# Patient Record
Sex: MALE | Race: ASIAN | Employment: FULL TIME | URBAN - METROPOLITAN AREA
[De-identification: names, ages, dates, MRNs, and addresses within clinical notes are randomized per-mention and may not be internally consistent; named-entity substitution may affect disease eponyms.]

---

## 2017-02-16 PROBLEM — S86.012S: Status: ACTIVE | Noted: 2017-02-16

## 2017-02-16 PROBLEM — S52.021S: Status: ACTIVE | Noted: 2017-02-16

## 2020-10-14 PROBLEM — R10.2 PERINEAL PAIN: Status: ACTIVE | Noted: 2020-10-14

## 2021-06-22 ENCOUNTER — HOSPITAL ENCOUNTER (OUTPATIENT)
Facility: HOSPITAL | Age: 36
Setting detail: OBSERVATION
Discharge: HOME | End: 2021-06-23
Admitting: INTERNAL MEDICINE
Payer: COMMERCIAL

## 2021-06-22 ENCOUNTER — APPOINTMENT (OUTPATIENT)
Dept: MRI IMAGING | Facility: HOSPITAL | Age: 36
End: 2021-06-22
Attending: EMERGENCY MEDICINE
Payer: COMMERCIAL

## 2021-06-22 ENCOUNTER — APPOINTMENT (OUTPATIENT)
Dept: CT IMAGING | Facility: HOSPITAL | Age: 36
End: 2021-06-22
Payer: COMMERCIAL

## 2021-06-22 DIAGNOSIS — N28.9 RENAL INSUFFICIENCY: ICD-10-CM

## 2021-06-22 DIAGNOSIS — G45.4 TGA (TRANSIENT GLOBAL AMNESIA): Primary | ICD-10-CM

## 2021-06-22 PROCEDURE — 70551 MRI BRAIN STEM W/O DYE: CPT | Performed by: EMERGENCY MEDICINE

## 2021-06-22 PROCEDURE — 70450 CT HEAD/BRAIN W/O DYE: CPT

## 2021-06-22 PROCEDURE — 99244 OFF/OP CNSLTJ NEW/EST MOD 40: CPT | Performed by: OTHER

## 2021-06-22 RX ORDER — ACETAMINOPHEN 325 MG/1
650 TABLET ORAL EVERY 6 HOURS PRN
Status: DISCONTINUED | OUTPATIENT
Start: 2021-06-22 | End: 2021-06-23

## 2021-06-22 RX ORDER — SODIUM CHLORIDE 9 MG/ML
INJECTION, SOLUTION INTRAVENOUS CONTINUOUS
Status: DISCONTINUED | OUTPATIENT
Start: 2021-06-22 | End: 2021-06-23

## 2021-06-22 RX ORDER — ONDANSETRON 2 MG/ML
4 INJECTION INTRAMUSCULAR; INTRAVENOUS EVERY 6 HOURS PRN
Status: DISCONTINUED | OUTPATIENT
Start: 2021-06-22 | End: 2021-06-23

## 2021-06-22 RX ORDER — MELATONIN
3 NIGHTLY PRN
Status: DISCONTINUED | OUTPATIENT
Start: 2021-06-22 | End: 2021-06-23

## 2021-06-22 NOTE — ED PROVIDER NOTES
Patient Seen in: [facility] AND [facility] Emergency Department    History   Patient presents with:  Dizziness  Altered Mental Status      HPI    57-year-old male presents the ER for altered mental status. Patient denies any medical history.   Patient states that use: No      ROS  All pertinent positives for the review of systems are mentioned in the HPI  All other organ systems are reviewed and are negative. Constitutional and vital signs reviewed.       Social History and Family History elements reviewed from t Cranial Nerves: No cranial nerve deficit, dysarthria or facial asymmetry. Sensory: No sensory deficit. Motor: No weakness. Deep Tendon Reflexes: Reflexes are normal and symmetric.    Psychiatric:         Mood and Affect: Mood normal. Abdiel Alvarez MD on 6/22/2021 at 4:26 PM     Finalized by (CST): Winston Paz MD on 6/22/2021 at 4:28 PM          MRI BRAIN WO ACUTE (3) SEQUENCE (CPT=70551)    Result Date: 6/22/2021  CONCLUSION:   Negative for acute infarct.   Negative for acute intracr evaluation. Patient without any neuro deficits the ER.     Condition upon leaving the department: Stable    Total Critical Care Time: 44 minutes including time spent examining and re-evaluating the patient, ordering and reviewing laboratory tests, document

## 2021-06-22 NOTE — CONSULTS
[facility]  [address] [facility], [address] [place] [address]  [phone]        [address]    Report of Consultation  Fabi June Patient Status:  Emergency     1985 MRN  and 1year-old children are currently sick. Patient works in \"[employer] \"  His exam is nonfocal.  He had very subtle internal rotation of his right arm on pronator drift testing, however he had no other signs of right-sided weakness or symptoms Mother    • Dementia Maternal Grandmother    • Cancer Maternal Grandfather         brain ca       Social History    Socioeconomic History      Marital status:       Spouse name: Not on file      Number of children: Not on file      Years of educatio Status: Alert and attentive. Oriented to person. He guessed said the season was spring. He did not remember the month. He did not know the day of the week, and guessed it was Wednesday. He did not know the date. He knew the year.   He had 5 out of 5 i Tremor:      Reflexes:    C5 C6 C7  L4 S1   R 1+ 1+  2+ 2+   L 1+ 1+  2+ 2+   Adductor Spread: No adductor spread noted. Frontal release signs:Not assessed.     Jaw Jerk:    Ken's sign:absent   Nonsustained clonus: Absent   Sustained clonus: Absent American 63 >=60    GFR, -American 73 >=60   CBC W/ DIFFERENTIAL    Collection Time: 06/22/21  4:07 PM   Result Value Ref Range    WBC 11.6 (H) 4.0 - 11.0 x10(3) uL    RBC 5.60 4.30 - 5.70 x10(6)uL    HGB 16.4 13.0 - 17.5 g/dL    HCT 47.3 39.0 - 53. he is on a nonrebreather mask, has deficits in his short-term memory, and continues to repeat the same questions. He had a questionable right pronator drift, but no loss of dexterity in his right hand, and 5 out of 5 strength in all muscle groups.   Most l

## 2021-06-22 NOTE — ED INITIAL ASSESSMENT (HPI)
Pt became dizzy and confused while at the gym today with c/o right sided headache. Pt does not recall phoning his wife to come pick him up. Wife reports he is asking questions repeatedly. Pt able to state name and  and follows commands.

## 2021-06-22 NOTE — ED QUICK NOTES
Assumed care of patient from CT. Patients wife at bedside as historian, stating patient was at the gym, called her saying he was light headed and at the gym but very confused.  Patient able to answer orientation questions appropriately, but keep stating \"

## 2021-06-22 NOTE — H&P
[facility] Hospitalist H&P       CC: Altered Mental Status     PCP: Gilmar Crowe MD    History of Present Illness:   38year old male with no prior medical history who presents to the hospital for evaluation of altered mental status.  Patient is accompan distress  HEENT: atraumatic, sclera anicteric, oral mucosa normal   Neck: non tender, no adenopathy   Lungs: clear to ausculation bilaterally, no wheezing  Heart: Regular rate and rhythm  Abdomen: soft, non tender, non distended   Extremities: No edema  Sk

## 2021-06-22 NOTE — ED QUICK NOTES
Orders for admission, patient is aware of plan and ready to go upstairs. Any questions, please call ED RN Jamia Haile  at extension [phone].    Type of COVID test sent: none- fully vaccinated   COVID Suspicion level: Low    Titratable drug(s) infusing: none  Rate:

## 2021-06-22 NOTE — ED QUICK NOTES
Patient dipped down to 75% on monitor, patient awake and stating he feels light headed.  10liters NRB placed on patient

## 2021-06-23 VITALS
WEIGHT: 210.88 LBS | SYSTOLIC BLOOD PRESSURE: 114 MMHG | BODY MASS INDEX: 26.22 KG/M2 | RESPIRATION RATE: 16 BRPM | HEART RATE: 80 BPM | DIASTOLIC BLOOD PRESSURE: 73 MMHG | HEIGHT: 75 IN | OXYGEN SATURATION: 97 % | TEMPERATURE: 98 F

## 2021-06-23 PROCEDURE — 95816 EEG AWAKE AND DROWSY: CPT | Performed by: OTHER

## 2021-06-23 PROCEDURE — 99213 OFFICE O/P EST LOW 20 MIN: CPT | Performed by: OTHER

## 2021-06-23 NOTE — DISCHARGE SUMMARY
General Medicine Discharge Summary     Patient ID:  Sanjana Karimi  38year old  6/30/1985    Admit date: 6/22/2021    Discharge date and time: 6/23/21      Attending Physician: DO Michela Goyal

## 2021-06-23 NOTE — PROCEDURES
EEG report    REFERRING PHYSICIAN: China Burton DO    PCP and phone number:  Jordan Oliveira MD  [phone]    TECHNIQUE: 21 channels of EEG, 2 channels of EOG, and 1 channel of EKG were recorded utilizing the International 10/20 System.  The rec

## 2021-06-23 NOTE — PROGRESS NOTES
[facility] Hospitalist Progress Note     CC: Hospital Follow up    PCP: Jean-Claude Harris MD       Assessment/Plan:   38year old male with no prior medical history who presents to the hospital for evaluation of altered mental status.     Altered mental status (BP Location: Right arm)   Pulse 71   Temp 98.4 °F (36.9 °C) (Oral)   Resp 14   Ht 6' 3\" (1.905 m)   Wt 210 lb 14.4 oz (95.7 kg)   SpO2 97%   BMI 26.36 kg/m²   General: Alert, no acute distress  HEENT: back of head with erythmatous area, consistent with t

## 2021-06-23 NOTE — PLAN OF CARE
Problem: Patient Centered Care  Goal: Patient preferences are identified and integrated in the patient's plan of care  Description: Interventions:  - What would you like us to know as we care for you?  I have two young daughters  - Provide timely, complet and request assistance  - Assess pain using appropriate pain scale  - Administer analgesics based on type and severity of pain and evaluate response  - Implement non-pharmacological measures as appropriate and evaluate response  - Consider cultural and soc post-hospital services based on physician/LIP order or complex needs related to functional status, cognitive ability or social support system  Outcome: Progressing     Patient on room air, no complaints of pain. Has some fullness in his head.  Complains of

## 2021-06-23 NOTE — PROGRESS NOTES
[facility]  [address] [facility], [address] [place] [address]  [phone]        INPATIENT NEUROLOGY   FOLLOW UP PROGRESS NOTE  [facility]    Odalys Burdick Patient Status:  Observation     1985 MRN [id number] evin was resting on his neck and shoulders. He was then putting the weight back on the physical rack when he stumbled backwards fell and struck his head. SUBJECTIVE:   [unfilled]  Answer patient and spouse's questions.   Clinically with superimp No flattening of hypothenar eminences. Right Left     Motor Strength   Knee extensors 5 5   Pronator drift: No pronator drift. trace curling of his fingertips bilaterally. Without subtle pronation. Arm Rolling: No orbiting.    Finger Taps: Finger Time: 06/22/21  4:07 PM   Result Value Ref Range    Glucose 173 (H) 70 - 99 mg/dL    Sodium 138 136 - 145 mmol/L    Potassium 3.3 (L) 3.5 - 5.1 mmol/L    Chloride 105 98 - 112 mmol/L    CO2 21.0 21.0 - 32.0 mmol/L    Anion Gap 12 0 - 18 mmol/L    BUN 16 7 5:14 PM    Specimen: Urine   Result Value Ref Range    Urine Color Yellow Yellow    Clarity Urine Clear Clear    Spec Gravity 1.017 1.001 - 1.030    Glucose Urine Negative Negative mg/dL    Bilirubin Urine Negative Negative    Ketones Urine 20  (A) Negati at 16:02 by Alonso Kwong MD      Performed an independent visualization of MRI brain  Imaging revealed:   No acute process    Education/Instructions given to: patient and spouse   Barriers to Learning:None  Content: Refer to note above.  Evaluation/Outc

## 2021-06-23 NOTE — PLAN OF CARE
Pt is A&Ox4- has no recollection of whereabouts prior to coming to the ED and during. Seizure precaution in place. On RA. Stand by while ambulates due to complaints of mild light headedness and slight headache. Tylenol given. Fluids running.  Fall precautio Monitor for seizure activity  - Administer anti-seizure medications as ordered  - Monitor neurological status  Outcome: Progressing     Problem: PAIN - ADULT  Goal: Verbalizes/displays adequate comfort level or patient's stated pain goal  Description: INTE discharge as needed  - Consider post-discharge preferences of patient/family/discharge partner  - Complete POLST form as appropriate  - Assess patient's ability to be responsible for managing their own health  - Refer to Case Management Department for coor